# Patient Record
Sex: MALE
[De-identification: names, ages, dates, MRNs, and addresses within clinical notes are randomized per-mention and may not be internally consistent; named-entity substitution may affect disease eponyms.]

---

## 2020-03-28 ENCOUNTER — TRANSCRIPTION ENCOUNTER (OUTPATIENT)
Age: 60
End: 2020-03-28

## 2020-04-07 ENCOUNTER — TRANSCRIPTION ENCOUNTER (OUTPATIENT)
Age: 60
End: 2020-04-07

## 2020-04-09 ENCOUNTER — APPOINTMENT (OUTPATIENT)
Dept: DISASTER EMERGENCY | Facility: CLINIC | Age: 60
End: 2020-04-09
Payer: COMMERCIAL

## 2020-04-09 VITALS
TEMPERATURE: 98.1 F | SYSTOLIC BLOOD PRESSURE: 110 MMHG | RESPIRATION RATE: 16 BRPM | HEART RATE: 83 BPM | OXYGEN SATURATION: 98 % | DIASTOLIC BLOOD PRESSURE: 71 MMHG

## 2020-04-09 DIAGNOSIS — U07.1 COVID-19: ICD-10-CM

## 2020-04-09 DIAGNOSIS — Z20.828 CONTACT WITH AND (SUSPECTED) EXPOSURE TO OTHER VIRAL COMMUNICABLE DISEASES: ICD-10-CM

## 2020-04-09 DIAGNOSIS — R05 COUGH: ICD-10-CM

## 2020-04-09 PROBLEM — Z00.00 ENCOUNTER FOR PREVENTIVE HEALTH EXAMINATION: Status: ACTIVE | Noted: 2020-04-09

## 2020-04-09 PROCEDURE — 99213 OFFICE O/P EST LOW 20 MIN: CPT

## 2020-04-10 LAB — SARS-COV-2 N GENE NPH QL NAA+PROBE: ABNORMAL

## 2020-05-25 PROBLEM — R05 COUGH: Status: ACTIVE | Noted: 2020-05-25

## 2020-05-25 PROBLEM — U07.1 2019 NOVEL CORONAVIRUS DISEASE (COVID-19): Status: ACTIVE | Noted: 2020-05-25

## 2020-05-25 NOTE — HISTORY OF PRESENT ILLNESS
[Patient presents to the office today for COVID-19 evaluation and testing.] : Patient presents to the office today for COVID-19 evaluation and testing. [Patient has been pre-screened by RN at call center for appointment today with our facility.] : Patient has been pre-screened by RN at call center for appointment today with our facility. [] : no dizziness on standing [With Confirmed Case] : patient exposed to a confirmed case of COVID-19 [None] : none [Clear] : clear [Normal O2 sat at rest] : normal O2 sat at rest [FreeTextEntry1] : MELA MIRANDA is a   Y/O  patient   CC:"  I work in  transit   "\par \par PHMx: denies\par \par HPI: Pt has no symptoms currently\par  [TextBox_48] : Instructed patient to seek emergency medical treatment if SOB at rest develops, not able to speak in full sentences\par or worsening of any symptoms.\par Stay home when you are sick and attempt to self-quarantine yourself. \par · Try to choose a room in your home that can be used to separate sick household members from self-quarantined; preferably with a separate bathroom if possible. \par · Try to avoid close contact (within 6 feet or 2 meters) with other people and keep any interactions brief; as brief interactions are less likely to result in transmission. \par · If you have a mask and can tolerate wearing it; use it when around people. \par · If you have masks but can’t tolerate wearing it have other people wear a mask when in the same room. \par · You should not share dishes, drinking glasses, cups, eating utensils, towels, or bedding with other people or pets in your home. After using these items, they should be washed thoroughly with soap and water \par · Make sure that shared spaces in the home have good air flow, such as by an air conditioner or an opened window, weather permitting. \par · Prohibit visitors who do not have an essential need to be in the home. \par · You should restrict contact with pets and other animals while you are sick.\par \par  [TextBox_107] : \par -Likely dx of COVID-19\par -Supportive care advised: Encouraged to increase fluids, rest, and Tylenol prn as per 's recommendations\par -Discussed quarantine until 72 hours symptom free including fever free without use of Tylenol\par -Literature on COVID-19 and measures to prevent exposure to others provided and reviewed with pt\par -Pt to be notified by Jakob of results\par -Work note provided\par